# Patient Record
Sex: MALE | Race: BLACK OR AFRICAN AMERICAN | NOT HISPANIC OR LATINO | URBAN - METROPOLITAN AREA
[De-identification: names, ages, dates, MRNs, and addresses within clinical notes are randomized per-mention and may not be internally consistent; named-entity substitution may affect disease eponyms.]

---

## 2018-07-26 ENCOUNTER — EMERGENCY (EMERGENCY)
Facility: HOSPITAL | Age: 23
LOS: 1 days | Discharge: HOME | End: 2018-07-26
Attending: EMERGENCY MEDICINE
Payer: MEDICAID

## 2018-07-26 VITALS
SYSTOLIC BLOOD PRESSURE: 112 MMHG | TEMPERATURE: 98 F | OXYGEN SATURATION: 100 % | RESPIRATION RATE: 20 BRPM | DIASTOLIC BLOOD PRESSURE: 69 MMHG | HEART RATE: 60 BPM | WEIGHT: 100.09 LBS

## 2018-07-26 PROCEDURE — 71046 X-RAY EXAM CHEST 2 VIEWS: CPT | Mod: 26

## 2018-07-26 PROCEDURE — 99285 EMERGENCY DEPT VISIT HI MDM: CPT

## 2018-07-26 PROCEDURE — 71046 X-RAY EXAM CHEST 2 VIEWS: CPT

## 2018-07-26 PROCEDURE — 99283 EMERGENCY DEPT VISIT LOW MDM: CPT | Mod: 25

## 2018-07-26 PROCEDURE — 93010 ELECTROCARDIOGRAM REPORT: CPT

## 2018-07-26 PROCEDURE — 93005 ELECTROCARDIOGRAM TRACING: CPT

## 2018-07-26 RX ORDER — IBUPROFEN 200 MG
600 TABLET ORAL ONCE
Qty: 0 | Refills: 0 | Status: COMPLETED | OUTPATIENT
Start: 2018-07-26 | End: 2018-07-26

## 2018-07-26 RX ORDER — DICLOFENAC SODIUM 30 MG/G
1 GEL TOPICAL
Qty: 60 | Refills: 0 | OUTPATIENT
Start: 2018-07-26 | End: 2018-08-24

## 2018-07-26 RX ORDER — IBUPROFEN 200 MG
1 TABLET ORAL
Qty: 28 | Refills: 0 | OUTPATIENT
Start: 2018-07-26 | End: 2018-08-01

## 2018-07-26 NOTE — ED PROVIDER NOTE - OBJECTIVE STATEMENT
22 y/o M pt with no significant PMHx reported presents to ED c/o intermittent chest pain x 1 month. Pt reports pain with movement and inspiration. Pt denies h/o trauma to chest. Pt also denies heavy lifting or recent exertion. Pt denies h/o GI problems or asthma. Pt denies fever, chills, palpitations, shortness of breath, abd pain, or any other complaints.

## 2020-07-06 NOTE — ED PROVIDER NOTE - CARDIOVASCULAR [-], MLM
The [place] Prescription Drug Monitoring Program was reviewed today and patient is compliant with controlled medication refills.  There are no irregularities in refills noted.       no palpitations

## 2025-05-02 NOTE — ED PROVIDER NOTE - CROS ED ROS STATEMENT
What Is The Reason For Today's Visit?: Preventative Skin Check all other ROS negative except as per HPI